# Patient Record
(demographics unavailable — no encounter records)

---

## 2017-08-14 NOTE — EEG
PATIENT:LAITH THORNTON                   DATE OF SERVICE: 08/11/17
                                               MEDICAL RECORD: [id number]
DATE OF BIRTH: 02/02/58                        LOCATION:         [facility] 
                                               ADMISSION DATE: 08/11/17
REFERRING PHYSICIAN:                               
 
INTERPRETING PHYSICIAN: JAMISON HERNANDEZ MD            
 
 
DATE OF SERVICE:  08/11/2017
 
Referred by myself as an outpatient.
 
ELECTROENCEPHALOGRAM NUMBER:  [id number]
 
TECHNICAL DATA:  This electroencephalographic recording consists of
approximately 20 minutes of data collection utilizing the international 10/20
system of electrode placement and both referential and non-referential montages.
 Sixteen channels of electrocerebral recording are accompanied by a 17th channel
dedicated to the electrocardiographic rhythm and 2 channels of electromyographic
recording.  Recording is performed in the awake and drowsy states utilizing
activation by hyperventilation and photic stimulation.
 
ELECTROENCEPHALOGRAPHIC DATA:  The awake state comprises approximately 70% of
the recorded electrocerebral activity.  Electromyographic artifact is prominent
and rapid eye movements are seen.  The posterior dominant background consists of
a symmetric, semi-rhythmic, waxing and waning 10-11 Hz alpha activity, which is
suppressed by eye opening.
 
The drowsy state comprises the remaining portion of the recorded electrocerebral
activity.  Electromyographic artifact remains prominent.  Rapid eye movements
are not seen.  The posterior dominant background is relatively suppressed.
 
In both waking and drowsy states, occasional sharp waves within the after going
slow component are observed in the left frontal region, maximal on this scalp
recording at F7.  No clinical or electrocerebral seizures are identified. 
Hyperventilation and photic stimulation induced no abnormal change in the
recorded electrocerebral activity.
 
INTERPRETATION:  Sharp waves, focal, left frontal (awake and drowsy).
 
This electroencephalographic recording is indicative of an epileptogenic focus
of left frontal origin, maximal on this scalp recording at F7.
 
TRANSINT:[id number] Voice Confirmation ID: [id number] DOCUMENT ID: [id number]
 
 
                                           
                                           JAMISON HERNANDEZ MD            
 
 
 
Electronically Signed by JAMISON HERNANDEZ on 08/14/17 at 1800
CC:                                                             [id number]
DICTATION DATE: 08/13/17 0551     :     08/13/17 0909      DEP CLI 
                                                                      08/11/17
[facility]                                          
[address] [place] [address]